# Patient Record
Sex: FEMALE | Race: WHITE | HISPANIC OR LATINO | Employment: UNEMPLOYED | URBAN - METROPOLITAN AREA
[De-identification: names, ages, dates, MRNs, and addresses within clinical notes are randomized per-mention and may not be internally consistent; named-entity substitution may affect disease eponyms.]

---

## 2020-07-26 ENCOUNTER — APPOINTMENT (OUTPATIENT)
Dept: RADIOLOGY | Facility: MEDICAL CENTER | Age: 45
End: 2020-07-26
Attending: EMERGENCY MEDICINE
Payer: MEDICARE

## 2020-07-26 ENCOUNTER — HOSPITAL ENCOUNTER (EMERGENCY)
Facility: MEDICAL CENTER | Age: 45
End: 2020-07-26
Attending: EMERGENCY MEDICINE
Payer: MEDICARE

## 2020-07-26 VITALS
TEMPERATURE: 98.7 F | OXYGEN SATURATION: 97 % | HEART RATE: 76 BPM | SYSTOLIC BLOOD PRESSURE: 168 MMHG | HEIGHT: 60 IN | DIASTOLIC BLOOD PRESSURE: 87 MMHG | RESPIRATION RATE: 16 BRPM | BODY MASS INDEX: 35.73 KG/M2 | WEIGHT: 182 LBS
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
## 2020-07-26 DIAGNOSIS — S70.01XA CONTUSION OF RIGHT HIP, INITIAL ENCOUNTER: ICD-10-CM

## 2020-07-26 DIAGNOSIS — S40.011A CONTUSION OF RIGHT SHOULDER, INITIAL ENCOUNTER: ICD-10-CM

## 2020-07-26 PROCEDURE — 99283 EMERGENCY DEPT VISIT LOW MDM: CPT

## 2020-07-26 PROCEDURE — 73030 X-RAY EXAM OF SHOULDER: CPT | Mod: RT

## 2020-07-26 PROCEDURE — 73501 X-RAY EXAM HIP UNI 1 VIEW: CPT | Mod: RT

## 2020-07-26 RX ORDER — METFORMIN HYDROCHLORIDE 750 MG/1
750 TABLET, EXTENDED RELEASE ORAL 2 TIMES DAILY
COMMUNITY

## 2020-07-26 RX ORDER — LISINOPRIL 20 MG/1
20 TABLET ORAL DAILY
COMMUNITY

## 2020-07-26 SDOH — HEALTH STABILITY: MENTAL HEALTH: HOW MANY STANDARD DRINKS CONTAINING ALCOHOL DO YOU HAVE ON A TYPICAL DAY?: 1 OR 2

## 2020-07-26 SDOH — HEALTH STABILITY: MENTAL HEALTH: HOW OFTEN DO YOU HAVE A DRINK CONTAINING ALCOHOL?: MONTHLY OR LESS

## 2020-07-26 SDOH — HEALTH STABILITY: MENTAL HEALTH: HOW OFTEN DO YOU HAVE 6 OR MORE DRINKS ON ONE OCCASION?: NEVER

## 2020-07-26 NOTE — ED TRIAGE NOTES
pt to ED22 via EMS. pt was at [employer] when she slipped on water and had GLF. pt c/o pain 7/10 to R hip and R shoulder. pt denies head strike, denies head and neck pain at this time. VSS NAD. Pt's CBS was 282 per EMS they initiated 22g PIV in L wrist gave 200cc NSS, 4 zofran and 50 fentanyl for pain.

## 2020-07-26 NOTE — ED PROVIDER NOTES
ED Provider Note    Scribed for Cheko Cisneros M.D. by Daria Thomas. 7/26/2020  10:01 AM    Primary care provider: No primary care provider on file.  Means of arrival: Ambulance   History obtained from: Patient   History limited by: None     CHIEF COMPLAINT  Chief Complaint   Patient presents with   • GLF     pt to ED22 via EMS. pt was at [employer] when she slipped on water and had GLF. pt c/o pain 7/10 to R hip and R shoulder. pt denies head strike, denies head and neck pain at this time        HPI  Sofi Costello is a 45 y.o. female who presents to the Emergency Department with complaint of right hip pain status post fall that occurred today. She states that she was walking at [employer] when she slipped and had a ground level fall. As a result of her fall she injured her right hip and her right shoulder. Pain in both the hip and shoulder is constant,non-radiating, worsened with movement, relieved with rest, and she denies any focal numbness or weakness. The patient did not try to ambulate after her fall. She denies any head injury, neck pain, back pain, chest wall pain, loss of consciousness, or abdominal pain. The patient denies any other injury or symptoms of recent illness.    REVIEW OF SYSTEMS  Pertinent positives include Right hip pain, right shoulder pain.   Pertinent negatives include no  head injury, neck pain, back pain, chest wall pain, loss of consciousness, or abdominal pain.    All other systems reviewed and negative. See HPI for further details.       PAST MEDICAL HISTORY   has a past medical history of Diabetes (HCC) and Hypertension.    SURGICAL HISTORY  patient denies any surgical history    SOCIAL HISTORY  Social History     Tobacco Use   • Smoking status: Never Smoker   • Smokeless tobacco: Never Used   Substance Use Topics   • Alcohol use: Yes     Frequency: Monthly or less     Drinks per session: 1 or 2     Binge frequency: Never   • Drug use: Never      Social History     Substance and Sexual  Activity   Drug Use Never       FAMILY HISTORY  None noted    CURRENT MEDICATIONS  No current facility-administered medications for this encounter.     Current Outpatient Medications:   •  metFORMIN ER, 750 mg, Oral, BID  •  lisinopril, 20 mg, Oral, DAILY    ALLERGIES  No Known Allergies    PHYSICAL EXAM  VITAL SIGNS: BP (!) 189/93   Pulse 84   Temp 37.1 °C (98.7 °F) (Temporal)   Resp 16   Ht 1.524 m (5')   Wt 82.6 kg (182 lb)   SpO2 97%   BMI 35.54 kg/m²     Nursing note and vitals reviewed.  Constitutional: Well-developed and well-nourished. No distress.   HENT: Head is normocephalic and atraumatic. Oropharynx is clear and moist without exudate or erythema.   Eyes: Pupils are equal, round, and reactive to light. Conjunctiva are normal.   Cardiovascular: Normal rate and regular rhythm. No murmur heard. Normal radial pulses.  Pulmonary/Chest: Breath sounds normal. No wheezes or rales.   Abdominal: Soft and non-tender. No distention    Musculoskeletal: Extremities exhibit normal range of motion without edema. Tenderness over the right shoulder and right hip.   Neurological: Awake, alert and oriented to person, place, and time. No focal deficits noted.  Skin: Skin is warm and dry. No rash.   Psychiatric: Normal mood and affect. Appropriate for clinical situation.    DIAGNOSTIC STUDIES / PROCEDURES    EKG Interpretation  Not performed for this encounter.    LABS  No results found for this or any previous visit.    All labs reviewed by me.    RADIOLOGY  DX-SHOULDER 2+ RIGHT   Final Result      No fracture or dislocation of RIGHT shoulder.      DX-HIP-UNILATERAL-WITH PELVIS-1 VIEW RIGHT   Final Result      No RIGHT hip fracture or dislocation.        The radiologist's interpretation of all radiological studies have been reviewed by me.    COURSE & MEDICAL DECISION MAKING  Nursing notes, VS, PMSFHx reviewed in chart.       10:01 AM - Patient seen and examined at bedside. Ordered XR rigth shoulder and XR right  pelvis to evaluate her symptoms. The differential diagnoses include but are not limited to: Fracture, dislocation, contusion    10:32AM On repeat evaluation, I informed the patient that her imaging showed no fractures. We discussed discharge home after she was able to ambulate with a stable gait. She is agreeable to this plan.    X-rays do not demonstrate any evidence of acute traumatic injury.  Patient is able to ambulate without any difficulty.  Discharged home in stable condition.  Recommended over-the-counter nonsteroidal anti-inflammatories for pain control.  Discharged home in stable condition.      FINAL IMPRESSION  1. Fall, initial encounter    2. Contusion of right hip, initial encounter    3. Contusion of right shoulder, initial encounter        PRESCRIPTIONS  New Prescriptions    No medications on file       FOLLOW UP  [facility], Emergency Dept  [address] [facility] [address] [phone]    If symptoms worsen        -DISCHARGE-  The patient will be discharged in stable condition.     Daria WHITEHEAD (Joe), am scribing for, and in the presence of, Cheko Cisneros M.D..    Electronically signed by: Daria Thomas (Joe), 7/26/2020    Cheko WHITEHEAD M.D. personally performed the services described in this documentation, as scribed by Daria Thomas in my presence, and it is both accurate and complete.    The note accurately reflects work and decisions made by me.  Cheko Cisneros M.D.  7/26/2020  11:11 AM  C